# Patient Record
Sex: MALE | Race: WHITE | Employment: FULL TIME | URBAN - METROPOLITAN AREA
[De-identification: names, ages, dates, MRNs, and addresses within clinical notes are randomized per-mention and may not be internally consistent; named-entity substitution may affect disease eponyms.]

---

## 2019-05-20 ENCOUNTER — HOSPITAL ENCOUNTER (EMERGENCY)
Facility: CLINIC | Age: 49
Discharge: HOME | End: 2019-05-20
Attending: EMERGENCY MEDICINE | Admitting: EMERGENCY MEDICINE
Payer: OTHER MISCELLANEOUS

## 2019-05-20 VITALS
SYSTOLIC BLOOD PRESSURE: 148 MMHG | DIASTOLIC BLOOD PRESSURE: 87 MMHG | TEMPERATURE: 98.2 F | OXYGEN SATURATION: 96 % | HEART RATE: 82 BPM | RESPIRATION RATE: 18 BRPM

## 2019-05-20 DIAGNOSIS — M25.551 HIP PAIN, RIGHT: ICD-10-CM

## 2019-05-20 PROCEDURE — 99282 EMERGENCY DEPT VISIT SF MDM: CPT

## 2019-05-20 RX ORDER — HYDROCODONE BITARTRATE AND ACETAMINOPHEN 5; 325 MG/1; MG/1
1 TABLET ORAL EVERY 6 HOURS PRN
Qty: 10 TABLET | Refills: 0 | Status: SHIPPED | OUTPATIENT
Start: 2019-05-20 | End: 2022-03-01

## 2019-05-20 RX ORDER — IBUPROFEN 600 MG/1
600 TABLET, FILM COATED ORAL EVERY 6 HOURS PRN
Qty: 20 TABLET | Refills: 0 | Status: ON HOLD | OUTPATIENT
Start: 2019-05-20 | End: 2022-03-03

## 2019-05-20 ASSESSMENT — ENCOUNTER SYMPTOMS
ABDOMINAL PAIN: 0
MYALGIAS: 1
FEVER: 0
HEADACHES: 0
JOINT SWELLING: 1

## 2019-05-20 NOTE — ED TRIAGE NOTES
Friday pt injured pelvis and right knee and is still in pain. [facility]    Pt A&O x 3, CMS x 3, ABCD's adequate in triage

## 2019-05-20 NOTE — ED PROVIDER NOTES
History     Chief Complaint:  R. Hip pain    HPI   Jeromy Humphries is a 48 year old male who presents to the ED for persistent right hip pain. A few nights ago while he was working his night shift, his foot caught on a stair, twisting his leg. Patient denies falling during this event. Since this time, he has been experiencing right hip pain, as well as a swollen knee. Pain is increased when walking. He was in the ER on Friday near his home ([place]) where they took X-rays which were unremarkable, report below. He has been taking Ibuprofen 800 mg and prednisone and that has not helped his pain. Patient drives 110 miles to work each day, and presents to this ED due to proximity to work. He denies fever, back pain, urinary issues, numbness, and tingling. No history DVT.      X-Ray Hip 2V RT + pelvis 5/17/2019  Findings: Mild degenerative change in the sacroiliac joints and bilateral hips. No acute fracture or dislocation. Deformity of the superior of the superior right pubic ramus likely related to an old healed injury.  Zain Anderson MD     Allergies:  No Known Drug Allergies     Medications:    Omeprazole    Past Medical History:    GERD    Past Surgical History:    History reviewed. No pertinent surgical history.    Family History:    History reviewed. No pertinent family history.    Social History:  Smoking status: current everyday smoker  Alcohol use: yes  Marital Status: unknown      Review of Systems   Constitutional: Negative for fever.   Cardiovascular: Negative for chest pain.   Gastrointestinal: Negative for abdominal pain.   Musculoskeletal: Positive for gait problem, joint swelling and myalgias.   Neurological: Negative for headaches.   All other systems reviewed and are negative.      Physical Exam   First Vitals:  BP: (!) 167/123  Pulse: 94  Temp: 98.2  F (36.8  C)  Resp: 18  SpO2: 99 %      Physical Exam  Nursing note and vitals reviewed.  Constitutional: Well nourished.   Eyes: Conjunctiva  normal.  Pupils are equal, round, and reactive to light.   ENT: Nose normal. Mucous membranes pink and moist.    Neck: Normal range of motion.  CVS: Normal rate, regular rhythm.  Normal heart sounds.  No murmur.  Pulmonary: Lungs clear to auscultation bilaterally. No wheezes/rales/rhonchi.  GI: Abdomen soft. Nontender, nondistended. No rigidity or guarding.    MSK: No calf tenderness or swelling.  R. Hip Exam:  Inspection: No ecchymosis, erythema. No soft tissue swelling to RLE  Palpation: TTP over the R. hip  Strength: Able to actively flex/ext/add/abd hip. Gait stable.   Sensation: Intact to light touch distally  Cap refil:   < 2 seconds distally.   DP/PT Pulses  normal  R. knee: full flex/ext w/o pain, no ttp.    R. ankle: Able to flex/ext toes and DF/PF ankle actively. No TTP.  Neuro: Alert. Follows simple commands.  Skin: Skin is warm and dry. No rash noted.   Psychiatric: Normal affect.       Emergency Department Course   Emergency Department Course:  Past medical records, nursing notes, and vitals reviewed.  0608: I performed an exam of the patient and obtained history, as documented above.  0630: I rechecked the patient. Findings and plan explained to the Patient. Patient discharged home with instructions regarding supportive care, medications, and reasons to return. The importance of close follow-up was reviewed.     Impression & Plan      Medical Decision Makin yr old male presenting with persistent right hip pain. He his hypertensive on arrival but otherwise nontoxic appearing. His BP downtrended in ED without interventions. He is neurovascularly intact and without evidence of trauma. I reviewed patients recent XR report which was without local fracture. I did discuss with patient it is likely musculoskeletal in etiology. He reports the pain came on after he twisted his hip. He has been on outpatient prednisone as well as high dose Ibuprofen.  I did discuss with patient my recommendation to cease  said prednisone at this time. I recommended that he continue his Ibuprofen (though will change to 600mg)  and supplement with Norco for breakthrough pain. I counseled patient and considered other etiologies including but not limited to DVT, due to the fact that the patient travels in the car for long periods of time, though given reported mechanism, I have a stronger suspicion for musculoskeletal etiology. Patient was offered formal duplex today as well though declined. He was given Orthopedic follow up should his pain persist to follow up in a week. I recommended close PCP follow up for reevaluation in the next few days. Return to ED for focal weakness, increasing parenthesis, or if symptoms worsen or change.    Diagnosis:    ICD-10-CM    1. Hip pain, right M25.551        Disposition:  Discharged to home    Discharge Medications:     Medication List      Started    HYDROcodone-acetaminophen 5-325 MG tablet  Commonly known as:  NORCO  1 tablet, Oral, EVERY 6 HOURS PRN     ibuprofen 600 MG tablet  Commonly known as:  ADVIL/MOTRIN  600 mg, Oral, EVERY 6 HOURS PRN              Kimberley Montoya  5/20/2019   [facility] EMERGENCY DEPARTMENT  Scribe Disclosure:  DAMARIS, Kimberley Montoya, am serving as a scribe at 6:05 AM on 5/20/2019 to document services personally performed by La Rothman DO based on my observations and the provider's statements to me.        La Rothman DO  05/20/19 0261

## 2019-07-03 ENCOUNTER — TRANSFERRED RECORDS (OUTPATIENT)
Dept: HEALTH INFORMATION MANAGEMENT | Facility: CLINIC | Age: 49
End: 2019-07-03
Payer: COMMERCIAL

## 2021-07-16 ENCOUNTER — RECORDS - HEALTHEAST (OUTPATIENT)
Dept: ADMINISTRATIVE | Facility: CLINIC | Age: 51
End: 2021-07-16

## 2022-02-09 DIAGNOSIS — Z11.59 ENCOUNTER FOR SCREENING FOR OTHER VIRAL DISEASES: Primary | ICD-10-CM

## 2022-02-21 ENCOUNTER — TRANSFERRED RECORDS (OUTPATIENT)
Dept: MULTI SPECIALTY CLINIC | Facility: CLINIC | Age: 52
End: 2022-02-21

## 2022-02-21 LAB
CREATININE (EXTERNAL): 1.1 MG/DL (ref 0.6–1.2)
GFR ESTIMATED (EXTERNAL): 70 ML/MIN/{1.73M2}
GLUCOSE (EXTERNAL): 103 MG/DL (ref 70–99)
POTASSIUM (EXTERNAL): 4.1 MEQ/L (ref 3.4–5.1)

## 2022-03-01 RX ORDER — HYDROXYZINE HYDROCHLORIDE 25 MG/1
50 TABLET, FILM COATED ORAL AT BEDTIME
Status: ON HOLD | COMMUNITY
End: 2022-03-04

## 2022-03-03 ENCOUNTER — ANCILLARY PROCEDURE (OUTPATIENT)
Dept: ULTRASOUND IMAGING | Facility: CLINIC | Age: 52
End: 2022-03-03
Attending: ANESTHESIOLOGY

## 2022-03-03 ENCOUNTER — APPOINTMENT (OUTPATIENT)
Dept: RADIOLOGY | Facility: CLINIC | Age: 52
DRG: 460 | End: 2022-03-03
Attending: ORTHOPAEDIC SURGERY
Payer: OTHER MISCELLANEOUS

## 2022-03-03 ENCOUNTER — HOSPITAL ENCOUNTER (INPATIENT)
Facility: CLINIC | Age: 52
LOS: 1 days | Discharge: HOME | DRG: 460 | End: 2022-03-04
Attending: ORTHOPAEDIC SURGERY | Admitting: ORTHOPAEDIC SURGERY
Payer: OTHER MISCELLANEOUS

## 2022-03-03 ENCOUNTER — ANESTHESIA (OUTPATIENT)
Dept: SURGERY | Facility: CLINIC | Age: 52
DRG: 460 | End: 2022-03-03
Payer: OTHER MISCELLANEOUS

## 2022-03-03 ENCOUNTER — ANESTHESIA EVENT (OUTPATIENT)
Dept: SURGERY | Facility: CLINIC | Age: 52
DRG: 460 | End: 2022-03-03
Payer: OTHER MISCELLANEOUS

## 2022-03-03 PROBLEM — M51.9 FORAMINAL STENOSIS DUE TO INTERVERTEBRAL DISC DISEASE: Status: ACTIVE | Noted: 2022-03-03

## 2022-03-03 PROBLEM — M99.79 FORAMINAL STENOSIS DUE TO INTERVERTEBRAL DISC DISEASE: Status: ACTIVE | Noted: 2022-03-03

## 2022-03-03 LAB
ABO/RH(D): NORMAL
ANTIBODY SCREEN: NEGATIVE
ERYTHROCYTE [DISTWIDTH] IN BLOOD BY AUTOMATED COUNT: 12.1 % (ref 10–15)
HCT VFR BLD AUTO: 47.4 % (ref 40–53)
HGB BLD-MCNC: 16.8 G/DL (ref 13.3–17.7)
MCH RBC QN AUTO: 30.9 PG (ref 26.5–33)
MCHC RBC AUTO-ENTMCNC: 35.4 G/DL (ref 31.5–36.5)
MCV RBC AUTO: 87 FL (ref 78–100)
PLATELET # BLD AUTO: 223 10E3/UL (ref 150–450)
RBC # BLD AUTO: 5.44 10E6/UL (ref 4.4–5.9)
SPECIMEN EXPIRATION DATE: NORMAL
WBC # BLD AUTO: 6.6 10E3/UL (ref 4–11)

## 2022-03-03 PROCEDURE — L8699 PROSTHETIC IMPLANT NOS: HCPCS | Performed by: ORTHOPAEDIC SURGERY

## 2022-03-03 PROCEDURE — 258N000003 HC RX IP 258 OP 636: Performed by: ORTHOPAEDIC SURGERY

## 2022-03-03 PROCEDURE — 250N000009 HC RX 250: Performed by: ORTHOPAEDIC SURGERY

## 2022-03-03 PROCEDURE — 360N000085 HC SURGERY LEVEL 5 W/ FLUORO, PER MIN: Performed by: ORTHOPAEDIC SURGERY

## 2022-03-03 PROCEDURE — C1713 ANCHOR/SCREW BN/BN,TIS/BN: HCPCS | Performed by: ORTHOPAEDIC SURGERY

## 2022-03-03 PROCEDURE — 999N000141 HC STATISTIC PRE-PROCEDURE NURSING ASSESSMENT: Performed by: ORTHOPAEDIC SURGERY

## 2022-03-03 PROCEDURE — 250N000025 HC SEVOFLURANE, PER MIN: Performed by: ORTHOPAEDIC SURGERY

## 2022-03-03 PROCEDURE — 258N000003 HC RX IP 258 OP 636: Performed by: ANESTHESIOLOGY

## 2022-03-03 PROCEDURE — 86850 RBC ANTIBODY SCREEN: CPT | Performed by: PHYSICIAN ASSISTANT

## 2022-03-03 PROCEDURE — C1762 CONN TISS, HUMAN(INC FASCIA): HCPCS | Performed by: ORTHOPAEDIC SURGERY

## 2022-03-03 PROCEDURE — 710N000010 HC RECOVERY PHASE 1, LEVEL 2, PER MIN: Performed by: ORTHOPAEDIC SURGERY

## 2022-03-03 PROCEDURE — 999N000063 XR ABDOMEN PORT 1 VIEWS

## 2022-03-03 PROCEDURE — 85027 COMPLETE CBC AUTOMATED: CPT | Performed by: PHYSICIAN ASSISTANT

## 2022-03-03 PROCEDURE — 250N000011 HC RX IP 250 OP 636

## 2022-03-03 PROCEDURE — 0SG00A0 FUSION OF LUMBAR VERTEBRAL JOINT WITH INTERBODY FUSION DEVICE, ANTERIOR APPROACH, ANTERIOR COLUMN, OPEN APPROACH: ICD-10-PCS | Performed by: ORTHOPAEDIC SURGERY

## 2022-03-03 PROCEDURE — 120N000001 HC R&B MED SURG/OB

## 2022-03-03 PROCEDURE — 250N000011 HC RX IP 250 OP 636: Performed by: NURSE ANESTHETIST, CERTIFIED REGISTERED

## 2022-03-03 PROCEDURE — 86901 BLOOD TYPING SEROLOGIC RH(D): CPT | Performed by: PHYSICIAN ASSISTANT

## 2022-03-03 PROCEDURE — 250N000013 HC RX MED GY IP 250 OP 250 PS 637: Performed by: ORTHOPAEDIC SURGERY

## 2022-03-03 PROCEDURE — 250N000011 HC RX IP 250 OP 636: Performed by: ORTHOPAEDIC SURGERY

## 2022-03-03 PROCEDURE — 250N000013 HC RX MED GY IP 250 OP 250 PS 637: Performed by: PHYSICIAN ASSISTANT

## 2022-03-03 PROCEDURE — 999N000182 XR SURGERY CARM FLUORO GREATER THAN 5 MIN: Mod: TC

## 2022-03-03 PROCEDURE — 36415 COLL VENOUS BLD VENIPUNCTURE: CPT | Performed by: PHYSICIAN ASSISTANT

## 2022-03-03 PROCEDURE — 0ST20ZZ RESECTION OF LUMBAR VERTEBRAL DISC, OPEN APPROACH: ICD-10-PCS | Performed by: ORTHOPAEDIC SURGERY

## 2022-03-03 PROCEDURE — 272N000001 HC OR GENERAL SUPPLY STERILE: Performed by: ORTHOPAEDIC SURGERY

## 2022-03-03 PROCEDURE — 272N000004 HC RX 272: Performed by: ORTHOPAEDIC SURGERY

## 2022-03-03 PROCEDURE — 370N000017 HC ANESTHESIA TECHNICAL FEE, PER MIN: Performed by: ORTHOPAEDIC SURGERY

## 2022-03-03 PROCEDURE — 250N000011 HC RX IP 250 OP 636: Performed by: ANESTHESIOLOGY

## 2022-03-03 PROCEDURE — 250N000009 HC RX 250

## 2022-03-03 PROCEDURE — 250N000011 HC RX IP 250 OP 636: Performed by: PHYSICIAN ASSISTANT

## 2022-03-03 PROCEDURE — 250N000009 HC RX 250: Performed by: NURSE ANESTHETIST, CERTIFIED REGISTERED

## 2022-03-03 PROCEDURE — 250N000005 HC OR RX SURGIFLO HEMOSTATIC MATRIX 10ML 199102S OPNP: Performed by: ORTHOPAEDIC SURGERY

## 2022-03-03 DEVICE — ROI-A ANCHORING PLATE M
Type: IMPLANTABLE DEVICE | Site: SPINE LUMBAR | Status: FUNCTIONAL
Brand: ROI-A

## 2022-03-03 DEVICE — GRAFT BONE INFUSE BMP XSM 7510100: Type: IMPLANTABLE DEVICE | Site: SPINE LUMBAR | Status: FUNCTIONAL

## 2022-03-03 DEVICE — ROI-A MEDIAN IMPLANT PXL 30X39 H14 10°
Type: IMPLANTABLE DEVICE | Site: SPINE LUMBAR | Status: FUNCTIONAL
Brand: ROI-A

## 2022-03-03 DEVICE — IMP BONE MATRIX 5CC HUMAN DEMINERALIZED ABS-2012-05: Type: IMPLANTABLE DEVICE | Site: SPINE LUMBAR | Status: FUNCTIONAL

## 2022-03-03 DEVICE — GRAFT BONE INFUSE BMP SM 7510200: Type: IMPLANTABLE DEVICE | Site: SPINE LUMBAR | Status: FUNCTIONAL

## 2022-03-03 RX ORDER — LIDOCAINE 40 MG/G
CREAM TOPICAL
Status: DISCONTINUED | OUTPATIENT
Start: 2022-03-03 | End: 2022-03-03 | Stop reason: HOSPADM

## 2022-03-03 RX ORDER — ONDANSETRON 4 MG/1
4 TABLET, ORALLY DISINTEGRATING ORAL EVERY 30 MIN PRN
Status: DISCONTINUED | OUTPATIENT
Start: 2022-03-03 | End: 2022-03-03 | Stop reason: HOSPADM

## 2022-03-03 RX ORDER — METHOCARBAMOL 750 MG/1
750 TABLET, FILM COATED ORAL EVERY 6 HOURS PRN
Status: DISCONTINUED | OUTPATIENT
Start: 2022-03-03 | End: 2022-03-04 | Stop reason: HOSPADM

## 2022-03-03 RX ORDER — HYDROMORPHONE HYDROCHLORIDE 2 MG/1
2 TABLET ORAL EVERY 4 HOURS PRN
Status: DISCONTINUED | OUTPATIENT
Start: 2022-03-03 | End: 2022-03-04 | Stop reason: HOSPADM

## 2022-03-03 RX ORDER — HYDROXYZINE HYDROCHLORIDE 50 MG/ML
25 INJECTION, SOLUTION INTRAMUSCULAR EVERY 4 HOURS PRN
Status: DISCONTINUED | OUTPATIENT
Start: 2022-03-03 | End: 2022-03-04 | Stop reason: HOSPADM

## 2022-03-03 RX ORDER — HYDROMORPHONE HYDROCHLORIDE 4 MG/1
4 TABLET ORAL EVERY 4 HOURS PRN
Status: DISCONTINUED | OUTPATIENT
Start: 2022-03-03 | End: 2022-03-04 | Stop reason: HOSPADM

## 2022-03-03 RX ORDER — NALOXONE HYDROCHLORIDE 0.4 MG/ML
0.4 INJECTION, SOLUTION INTRAMUSCULAR; INTRAVENOUS; SUBCUTANEOUS
Status: DISCONTINUED | OUTPATIENT
Start: 2022-03-03 | End: 2022-03-04 | Stop reason: HOSPADM

## 2022-03-03 RX ORDER — ONDANSETRON 4 MG/1
4 TABLET, ORALLY DISINTEGRATING ORAL EVERY 6 HOURS PRN
Status: DISCONTINUED | OUTPATIENT
Start: 2022-03-03 | End: 2022-03-04 | Stop reason: HOSPADM

## 2022-03-03 RX ORDER — GABAPENTIN 300 MG/1
300 CAPSULE ORAL
Status: COMPLETED | OUTPATIENT
Start: 2022-03-03 | End: 2022-03-03

## 2022-03-03 RX ORDER — ACETAMINOPHEN 325 MG/1
650 TABLET ORAL EVERY 4 HOURS PRN
Status: DISCONTINUED | OUTPATIENT
Start: 2022-03-06 | End: 2022-03-04 | Stop reason: HOSPADM

## 2022-03-03 RX ORDER — ONDANSETRON 2 MG/ML
4 INJECTION INTRAMUSCULAR; INTRAVENOUS EVERY 6 HOURS PRN
Status: DISCONTINUED | OUTPATIENT
Start: 2022-03-03 | End: 2022-03-04 | Stop reason: HOSPADM

## 2022-03-03 RX ORDER — PROCHLORPERAZINE MALEATE 10 MG
10 TABLET ORAL EVERY 6 HOURS PRN
Status: DISCONTINUED | OUTPATIENT
Start: 2022-03-03 | End: 2022-03-04 | Stop reason: HOSPADM

## 2022-03-03 RX ORDER — POLYETHYLENE GLYCOL 3350 17 G/17G
17 POWDER, FOR SOLUTION ORAL DAILY
Status: DISCONTINUED | OUTPATIENT
Start: 2022-03-04 | End: 2022-03-04 | Stop reason: HOSPADM

## 2022-03-03 RX ORDER — HYDROMORPHONE HCL IN WATER/PF 6 MG/30 ML
0.2 PATIENT CONTROLLED ANALGESIA SYRINGE INTRAVENOUS EVERY 5 MIN PRN
Status: DISCONTINUED | OUTPATIENT
Start: 2022-03-03 | End: 2022-03-03 | Stop reason: HOSPADM

## 2022-03-03 RX ORDER — SODIUM CHLORIDE, SODIUM LACTATE, POTASSIUM CHLORIDE, CALCIUM CHLORIDE 600; 310; 30; 20 MG/100ML; MG/100ML; MG/100ML; MG/100ML
INJECTION, SOLUTION INTRAVENOUS CONTINUOUS
Status: DISCONTINUED | OUTPATIENT
Start: 2022-03-03 | End: 2022-03-03 | Stop reason: HOSPADM

## 2022-03-03 RX ORDER — SODIUM CHLORIDE 9 MG/ML
INJECTION, SOLUTION INTRAVENOUS CONTINUOUS
Status: DISCONTINUED | OUTPATIENT
Start: 2022-03-03 | End: 2022-03-04 | Stop reason: HOSPADM

## 2022-03-03 RX ORDER — KETAMINE HYDROCHLORIDE 50 MG/ML
INJECTION, SOLUTION INTRAMUSCULAR; INTRAVENOUS PRN
Status: DISCONTINUED | OUTPATIENT
Start: 2022-03-03 | End: 2022-03-03

## 2022-03-03 RX ORDER — ACETAMINOPHEN 325 MG/1
975 TABLET ORAL EVERY 8 HOURS
Status: DISCONTINUED | OUTPATIENT
Start: 2022-03-03 | End: 2022-03-04 | Stop reason: HOSPADM

## 2022-03-03 RX ORDER — NALOXONE HYDROCHLORIDE 0.4 MG/ML
0.2 INJECTION, SOLUTION INTRAMUSCULAR; INTRAVENOUS; SUBCUTANEOUS
Status: DISCONTINUED | OUTPATIENT
Start: 2022-03-03 | End: 2022-03-04 | Stop reason: HOSPADM

## 2022-03-03 RX ORDER — FENTANYL CITRATE 50 UG/ML
25 INJECTION, SOLUTION INTRAMUSCULAR; INTRAVENOUS
Status: DISCONTINUED | OUTPATIENT
Start: 2022-03-03 | End: 2022-03-03 | Stop reason: HOSPADM

## 2022-03-03 RX ORDER — FENTANYL CITRATE 50 UG/ML
25 INJECTION, SOLUTION INTRAMUSCULAR; INTRAVENOUS EVERY 5 MIN PRN
Status: DISCONTINUED | OUTPATIENT
Start: 2022-03-03 | End: 2022-03-03 | Stop reason: HOSPADM

## 2022-03-03 RX ORDER — MULTIVITAMIN,THERAPEUTIC
1 TABLET ORAL DAILY
Status: DISCONTINUED | OUTPATIENT
Start: 2022-03-04 | End: 2022-03-04 | Stop reason: HOSPADM

## 2022-03-03 RX ORDER — OXYCODONE HYDROCHLORIDE 5 MG/1
5-10 TABLET ORAL EVERY 4 HOURS PRN
Status: DISCONTINUED | OUTPATIENT
Start: 2022-03-03 | End: 2022-03-04 | Stop reason: HOSPADM

## 2022-03-03 RX ORDER — LIDOCAINE HYDROCHLORIDE 10 MG/ML
INJECTION, SOLUTION INFILTRATION; PERINEURAL PRN
Status: DISCONTINUED | OUTPATIENT
Start: 2022-03-03 | End: 2022-03-03

## 2022-03-03 RX ORDER — HYDROXYZINE HYDROCHLORIDE 25 MG/1
25 TABLET, FILM COATED ORAL EVERY 4 HOURS PRN
Status: DISCONTINUED | OUTPATIENT
Start: 2022-03-03 | End: 2022-03-04 | Stop reason: HOSPADM

## 2022-03-03 RX ORDER — LORATADINE 10 MG/1
10 TABLET ORAL DAILY PRN
Status: DISCONTINUED | OUTPATIENT
Start: 2022-03-03 | End: 2022-03-04 | Stop reason: HOSPADM

## 2022-03-03 RX ORDER — ACETAMINOPHEN 325 MG/1
975 TABLET ORAL ONCE
Status: COMPLETED | OUTPATIENT
Start: 2022-03-03 | End: 2022-03-03

## 2022-03-03 RX ORDER — FENTANYL CITRATE 50 UG/ML
100 INJECTION, SOLUTION INTRAMUSCULAR; INTRAVENOUS ONCE
Status: DISCONTINUED | OUTPATIENT
Start: 2022-03-03 | End: 2022-03-03 | Stop reason: HOSPADM

## 2022-03-03 RX ORDER — CEFAZOLIN SODIUM/WATER 2 G/20 ML
2 SYRINGE (ML) INTRAVENOUS SEE ADMIN INSTRUCTIONS
Status: DISCONTINUED | OUTPATIENT
Start: 2022-03-03 | End: 2022-03-03 | Stop reason: HOSPADM

## 2022-03-03 RX ORDER — DIAZEPAM 5 MG
5 TABLET ORAL EVERY 4 HOURS PRN
Status: DISCONTINUED | OUTPATIENT
Start: 2022-03-03 | End: 2022-03-04 | Stop reason: HOSPADM

## 2022-03-03 RX ORDER — CEFAZOLIN SODIUM/WATER 2 G/20 ML
2 SYRINGE (ML) INTRAVENOUS
Status: COMPLETED | OUTPATIENT
Start: 2022-03-03 | End: 2022-03-03

## 2022-03-03 RX ORDER — HYDROMORPHONE HCL IN WATER/PF 6 MG/30 ML
0.2 PATIENT CONTROLLED ANALGESIA SYRINGE INTRAVENOUS
Status: DISCONTINUED | OUTPATIENT
Start: 2022-03-03 | End: 2022-03-04 | Stop reason: HOSPADM

## 2022-03-03 RX ORDER — NAPROXEN SODIUM 220 MG
440 TABLET ORAL 2 TIMES DAILY PRN
Status: ON HOLD | COMMUNITY
End: 2022-03-04

## 2022-03-03 RX ORDER — MEPERIDINE HYDROCHLORIDE 25 MG/ML
12.5 INJECTION INTRAMUSCULAR; INTRAVENOUS; SUBCUTANEOUS
Status: DISCONTINUED | OUTPATIENT
Start: 2022-03-03 | End: 2022-03-03 | Stop reason: HOSPADM

## 2022-03-03 RX ORDER — OXYCODONE HYDROCHLORIDE 5 MG/1
5 TABLET ORAL EVERY 4 HOURS PRN
Status: DISCONTINUED | OUTPATIENT
Start: 2022-03-03 | End: 2022-03-03 | Stop reason: HOSPADM

## 2022-03-03 RX ORDER — FENTANYL CITRATE 50 UG/ML
100 INJECTION, SOLUTION INTRAMUSCULAR; INTRAVENOUS
Status: DISCONTINUED | OUTPATIENT
Start: 2022-03-03 | End: 2022-03-03 | Stop reason: HOSPADM

## 2022-03-03 RX ORDER — CEFAZOLIN SODIUM 2 G/100ML
2 INJECTION, SOLUTION INTRAVENOUS EVERY 8 HOURS
Status: COMPLETED | OUTPATIENT
Start: 2022-03-03 | End: 2022-03-04

## 2022-03-03 RX ORDER — HYDROXYZINE HYDROCHLORIDE 25 MG/1
25 TABLET, FILM COATED ORAL EVERY 6 HOURS PRN
Status: DISCONTINUED | OUTPATIENT
Start: 2022-03-03 | End: 2022-03-04 | Stop reason: HOSPADM

## 2022-03-03 RX ORDER — ONDANSETRON 2 MG/ML
4 INJECTION INTRAMUSCULAR; INTRAVENOUS EVERY 30 MIN PRN
Status: DISCONTINUED | OUTPATIENT
Start: 2022-03-03 | End: 2022-03-03 | Stop reason: HOSPADM

## 2022-03-03 RX ORDER — AMOXICILLIN 250 MG
1 CAPSULE ORAL 2 TIMES DAILY
Status: DISCONTINUED | OUTPATIENT
Start: 2022-03-03 | End: 2022-03-04 | Stop reason: HOSPADM

## 2022-03-03 RX ORDER — PROPOFOL 10 MG/ML
INJECTION, EMULSION INTRAVENOUS PRN
Status: DISCONTINUED | OUTPATIENT
Start: 2022-03-03 | End: 2022-03-03

## 2022-03-03 RX ORDER — HYDROMORPHONE HCL IN WATER/PF 6 MG/30 ML
0.4 PATIENT CONTROLLED ANALGESIA SYRINGE INTRAVENOUS
Status: DISCONTINUED | OUTPATIENT
Start: 2022-03-03 | End: 2022-03-04 | Stop reason: HOSPADM

## 2022-03-03 RX ORDER — OMEPRAZOLE 20 MG/1
20 TABLET, DELAYED RELEASE ORAL DAILY PRN
COMMUNITY

## 2022-03-03 RX ORDER — FENTANYL CITRATE 50 UG/ML
INJECTION, SOLUTION INTRAMUSCULAR; INTRAVENOUS PRN
Status: DISCONTINUED | OUTPATIENT
Start: 2022-03-03 | End: 2022-03-03

## 2022-03-03 RX ORDER — BISACODYL 10 MG
10 SUPPOSITORY, RECTAL RECTAL DAILY PRN
Status: DISCONTINUED | OUTPATIENT
Start: 2022-03-03 | End: 2022-03-04 | Stop reason: HOSPADM

## 2022-03-03 RX ORDER — DEXAMETHASONE SODIUM PHOSPHATE 10 MG/ML
INJECTION, SOLUTION INTRAMUSCULAR; INTRAVENOUS PRN
Status: DISCONTINUED | OUTPATIENT
Start: 2022-03-03 | End: 2022-03-03

## 2022-03-03 RX ORDER — ONDANSETRON 2 MG/ML
INJECTION INTRAMUSCULAR; INTRAVENOUS PRN
Status: DISCONTINUED | OUTPATIENT
Start: 2022-03-03 | End: 2022-03-03

## 2022-03-03 RX ADMIN — MIDAZOLAM 2 MG: 1 INJECTION INTRAMUSCULAR; INTRAVENOUS at 11:35

## 2022-03-03 RX ADMIN — SENNOSIDES AND DOCUSATE SODIUM 1 TABLET: 50; 8.6 TABLET ORAL at 21:32

## 2022-03-03 RX ADMIN — ACETAMINOPHEN 975 MG: 325 TABLET ORAL at 18:11

## 2022-03-03 RX ADMIN — SUGAMMADEX 200 MG: 100 INJECTION, SOLUTION INTRAVENOUS at 13:36

## 2022-03-03 RX ADMIN — HYDROXYZINE HYDROCHLORIDE 25 MG: 25 TABLET ORAL at 18:12

## 2022-03-03 RX ADMIN — DEXAMETHASONE SODIUM PHOSPHATE 10 MG: 10 INJECTION, SOLUTION INTRAMUSCULAR; INTRAVENOUS at 11:45

## 2022-03-03 RX ADMIN — HYDROMORPHONE HYDROCHLORIDE 0.4 MG: 0.2 INJECTION, SOLUTION INTRAMUSCULAR; INTRAVENOUS; SUBCUTANEOUS at 15:45

## 2022-03-03 RX ADMIN — HYDROMORPHONE HYDROCHLORIDE 4 MG: 4 TABLET ORAL at 16:45

## 2022-03-03 RX ADMIN — ROCURONIUM BROMIDE 70 MG: 10 INJECTION, SOLUTION INTRAVENOUS at 11:42

## 2022-03-03 RX ADMIN — GABAPENTIN 300 MG: 300 CAPSULE ORAL at 10:17

## 2022-03-03 RX ADMIN — OXYCODONE HYDROCHLORIDE 5 MG: 5 TABLET ORAL at 23:02

## 2022-03-03 RX ADMIN — FENTANYL CITRATE 25 MCG: 50 INJECTION, SOLUTION INTRAMUSCULAR; INTRAVENOUS at 14:29

## 2022-03-03 RX ADMIN — SODIUM CHLORIDE: 9 INJECTION, SOLUTION INTRAVENOUS at 16:13

## 2022-03-03 RX ADMIN — LIDOCAINE HYDROCHLORIDE 30 MG: 10 INJECTION, SOLUTION INFILTRATION; PERINEURAL at 11:42

## 2022-03-03 RX ADMIN — OXYCODONE HYDROCHLORIDE 10 MG: 5 TABLET ORAL at 18:12

## 2022-03-03 RX ADMIN — FENTANYL CITRATE 25 MCG: 50 INJECTION, SOLUTION INTRAMUSCULAR; INTRAVENOUS at 14:19

## 2022-03-03 RX ADMIN — HYDROMORPHONE HYDROCHLORIDE 0.5 MG: 1 INJECTION, SOLUTION INTRAMUSCULAR; INTRAVENOUS; SUBCUTANEOUS at 12:30

## 2022-03-03 RX ADMIN — FENTANYL CITRATE 25 MCG: 50 INJECTION, SOLUTION INTRAMUSCULAR; INTRAVENOUS at 14:03

## 2022-03-03 RX ADMIN — SODIUM CHLORIDE, POTASSIUM CHLORIDE, SODIUM LACTATE AND CALCIUM CHLORIDE: 600; 310; 30; 20 INJECTION, SOLUTION INTRAVENOUS at 10:35

## 2022-03-03 RX ADMIN — HYDROMORPHONE HYDROCHLORIDE 0.5 MG: 1 INJECTION, SOLUTION INTRAMUSCULAR; INTRAVENOUS; SUBCUTANEOUS at 13:15

## 2022-03-03 RX ADMIN — SODIUM CHLORIDE, POTASSIUM CHLORIDE, SODIUM LACTATE AND CALCIUM CHLORIDE: 600; 310; 30; 20 INJECTION, SOLUTION INTRAVENOUS at 12:30

## 2022-03-03 RX ADMIN — ROCURONIUM BROMIDE 30 MG: 10 INJECTION, SOLUTION INTRAVENOUS at 12:35

## 2022-03-03 RX ADMIN — ONDANSETRON 4 MG: 2 INJECTION INTRAMUSCULAR; INTRAVENOUS at 13:10

## 2022-03-03 RX ADMIN — KETAMINE HYDROCHLORIDE 50 MG: 50 INJECTION, SOLUTION INTRAMUSCULAR; INTRAVENOUS at 11:45

## 2022-03-03 RX ADMIN — FENTANYL CITRATE 100 MCG: 50 INJECTION, SOLUTION INTRAMUSCULAR; INTRAVENOUS at 11:42

## 2022-03-03 RX ADMIN — ACETAMINOPHEN 975 MG: 325 TABLET ORAL at 10:17

## 2022-03-03 RX ADMIN — FENTANYL CITRATE 25 MCG: 50 INJECTION, SOLUTION INTRAMUSCULAR; INTRAVENOUS at 13:55

## 2022-03-03 RX ADMIN — PROPOFOL 200 MG: 10 INJECTION, EMULSION INTRAVENOUS at 11:42

## 2022-03-03 RX ADMIN — Medication 2 G: at 12:00

## 2022-03-03 RX ADMIN — CEFAZOLIN SODIUM 2 G: 2 INJECTION, SOLUTION INTRAVENOUS at 19:40

## 2022-03-03 ASSESSMENT — ACTIVITIES OF DAILY LIVING (ADL)
ADLS_ACUITY_SCORE: 5
ADLS_ACUITY_SCORE: 3
ADLS_ACUITY_SCORE: 5
ADLS_ACUITY_SCORE: 3
ADLS_ACUITY_SCORE: 3
ADLS_ACUITY_SCORE: 5
ADLS_ACUITY_SCORE: 5
ADLS_ACUITY_SCORE: 12
ADLS_ACUITY_SCORE: 5
ADLS_ACUITY_SCORE: 5

## 2022-03-03 NOTE — PROGRESS NOTES
Portable abdominal xray taken intra-op at end of case to confirm that no foreign bodies or instrumentation was retained in the surgical wound. Dr. Pate confirmed

## 2022-03-03 NOTE — ANESTHESIA PREPROCEDURE EVALUATION
Anesthesia Pre-Procedure Evaluation    Patient: Jeromy Humphries   MRN: [id number] : 1970        Procedure : Procedure(s):  BILATERAL LUMBAR 3-4 ANTERIOR LUMBAR INTERBODY FUSION WITH INFUSE  SURGICAL EXPOSURE, ANTERIOR APPROACH, WITH WOUND CLOSURE, FOR LUMBAR SPINE SURGERY, BY GENERAL SURGERY          Past Medical History:   Diagnosis Date     Gastroesophageal reflux disease       Past Surgical History:   Procedure Laterality Date     ANKLE SURGERY Right      CARPAL TUNNEL RELEASE RT/LT Bilateral      MICRODISCECTOMY LUMBAR      L3-L4      Allergies   Allergen Reactions     Oxycodone Itching      Social History     Tobacco Use     Smoking status: Current Every Day Smoker     Packs/day: 0.50     Years: 25.00     Pack years: 12.50     Smokeless tobacco: Never Used   Substance Use Topics     Alcohol use: Yes     Comment: Rare/Social      Wt Readings from Last 1 Encounters:   22 102.5 kg (226 lb)        Anesthesia Evaluation            ROS/MED HX  ENT/Pulmonary:  - neg pulmonary ROS     Neurologic:  - neg neurologic ROS     Cardiovascular:  - neg cardiovascular ROS     METS/Exercise Tolerance:     Hematologic:  - neg hematologic  ROS     Musculoskeletal:  - neg musculoskeletal ROS     GI/Hepatic:  - neg GI/hepatic ROS     Renal/Genitourinary:  - neg Renal ROS     Endo:  - neg endo ROS     Psychiatric/Substance Use:  - neg psychiatric ROS     Infectious Disease:  - neg infectious disease ROS     Malignancy:  - neg malignancy ROS     Other:  - neg other ROS          Physical Exam    Airway  airway exam normal      Mallampati: II       Respiratory Devices and Support         Dental  no notable dental history         Cardiovascular   cardiovascular exam normal          Pulmonary   pulmonary exam normal                OUTSIDE LABS:  CBC: No results found for: WBC, HGB, HCT, PLT  BMP: No results found for: NA, POTASSIUM, CHLORIDE, CO2, BUN, CR, GLC  COAGS: No results found for: PTT, INR, FIBR  POC: No  results found for: BGM, HCG, HCGS  HEPATIC: No results found for: ALBUMIN, PROTTOTAL, ALT, AST, GGT, ALKPHOS, BILITOTAL, BILIDIRECT, YISEL  OTHER: No results found for: PH, LACT, A1C, ABRAHAM, PHOS, MAG, LIPASE, AMYLASE, TSH, T4, T3, CRP, SED    Anesthesia Plan    ASA Status:  2      Anesthesia Type: General.     - Airway: ETT   Induction: Intravenous.           Consents    Anesthesia Plan(s) and associated risks, benefits, and realistic alternatives discussed. Questions answered and patient/representative(s) expressed understanding.    - Discussed:     - Discussed with:  Patient         Postoperative Care            Comments:                Alexi Garvey MD

## 2022-03-03 NOTE — ANESTHESIA POSTPROCEDURE EVALUATION
Patient: Jeromy Humphries    Procedure: Procedure(s):  BILATERAL LUMBAR 3-4 ANTERIOR LUMBAR INTERBODY FUSION WITH INFUSE  SURGICAL EXPOSURE, ANTERIOR APPROACH, WITH WOUND CLOSURE, FOR LUMBAR SPINE SURGERY, BY GENERAL SURGERY       Anesthesia Type:  General    Note:     Postop Pain Control: Uneventful            Sign Out: Well controlled pain   PONV: No   Neuro/Psych: Uneventful            Sign Out: Acceptable/Baseline neuro status   Airway/Respiratory: Uneventful            Sign Out: Acceptable/Baseline resp. status   CV/Hemodynamics: Uneventful            Sign Out: Acceptable CV status   Other NRE: NONE   DID A NON-ROUTINE EVENT OCCUR? No           Last vitals:  Vitals Value Taken Time   /77 03/03/22 1450   Temp 37  C (98.6  F) 03/03/22 1450   Pulse 79 03/03/22 1451   Resp 10 03/03/22 1450   SpO2 97 % 03/03/22 1451   Vitals shown include unvalidated device data.    Electronically Signed By: Alexi Garvey MD  March 3, 2022  3:37 PM

## 2022-03-03 NOTE — ANESTHESIA PROCEDURE NOTES
Airway       Patient location during procedure: OR       Procedure Start/Stop Times: 3/3/2022 11:44 AM and 3/3/2022 11:47 AM  Staff -        CRNA: Narciso Pierson APRN CRNA       Performed By: CRNAIndications and Patient Condition       Indications for airway management: crystal-procedural         Mask difficulty assessment: 1 - vent by mask    Final Airway Details       Final airway type: endotracheal airway       Successful airway: ETT - single  Endotracheal Airway Details        ETT size (mm): 7.5       Cuffed: yes       Successful intubation technique: direct laryngoscopy       DL Blade Type: Vasquez 2       Grade View of Cords: 1       Adjucts: stylet and tooth guard       Position: Right       Measured from: lips       Secured at (cm): 23    Post intubation assessment        Placement verified by: capnometry, equal breath sounds and chest rise        Number of attempts at approach: 1       Secured with: pink tape       Ease of procedure: easy       Dentition: Intact and Unchanged

## 2022-03-03 NOTE — ANESTHESIA CARE TRANSFER NOTE
Patient: Jeromy Humphries    Procedure: Procedure(s):  BILATERAL LUMBAR 3-4 ANTERIOR LUMBAR INTERBODY FUSION WITH INFUSE  SURGICAL EXPOSURE, ANTERIOR APPROACH, WITH WOUND CLOSURE, FOR LUMBAR SPINE SURGERY, BY GENERAL SURGERY       Diagnosis: Acute low back pain [M54.50]  Diagnosis Additional Information: No value filed.    Anesthesia Type:   General     Note:    Oropharynx: oropharynx clear of all foreign objects  Level of Consciousness: awake  Oxygen Supplementation: face mask  Level of Supplemental Oxygen (L/min / FiO2): 6  Independent Airway: airway patency satisfactory and stable  Dentition: dentition unchanged  Vital Signs Stable: post-procedure vital signs reviewed and stable  Report to RN Given: handoff report given  Patient transferred to: PACU    Handoff Report: Identifed the Patient, Identified the Reponsible Provider, Reviewed the pertinent medical history, Discussed the surgical course, Reviewed Intra-OP anesthesia mangement and issues during anesthesia, Set expectations for post-procedure period and Allowed opportunity for questions and acknowledgement of understanding      Vitals:  Vitals Value Taken Time   /86 03/03/22 1347   Temp 36.3  C (97.3  F) 03/03/22 1347   Pulse 74 03/03/22 1347   Resp 8 03/03/22 1347   SpO2 95 % 03/03/22 1347       Electronically Signed By: SAPPHIRE Phillips CRNA  March 3, 2022  1:49 PM

## 2022-03-03 NOTE — PHARMACY-ADMISSION MEDICATION HISTORY
Pharmacy Note - Admission Medication History    Pertinent Provider Information:    ______________________________________________________________________    Prior To Admission (PTA) med list completed and updated in EMR.       PTA Med List   Medication Sig Last Dose     hydrOXYzine (ATARAX) 25 MG tablet Take 50 mg by mouth At Bedtime  3/2/2022 at 1800     naproxen sodium (ANAPROX) 220 MG tablet Take 440 mg by mouth 2 times daily as needed for moderate pain 2/26/2022     omeprazole (PRILOSEC OTC) 20 MG EC tablet Take 20 mg by mouth daily as needed 2/28/2022       Information source(s): Patient and CareEverywhere/SureScripts, clinic    Method of interview communication: in-person    Patient was asked about OTC/herbal products specifically.  PTA med list reflects this.    Based on the pharmacist's assessment, the PTA med list information appears reliable    Allergies were reviewed, assessed, and updated with the patient.      Patient does not use any multi-dose medications prior to admission.     Thank you for the opportunity to participate in the care of this patient.      Shari Birch RPH     3/3/2022     11:03 AM

## 2022-03-03 NOTE — OP NOTE
NAME: Jeromy Humphries  : 1970    Procedure Date: 2022    PREOPERATIVE DIAGNOSES:  Right L3 radiculopathy secondary to small recurrent right L3-4 herniated nucleus pulposus and foraminal stenosis.    POSTOPERATIVE DIAGNOSES:  Right L3 radiculopathy secondary to small recurrent right L3-4 herniated nucleus pulposus and foraminal stenosis.    PROCEDURE PERFORMED:    1.  L3-4 anterior lumbar interbody fusion.  2.  Application of anterior lumbar interbody device (LDR KEVIN-A PEEK interbody implant 14 mm height, 30 mm depth, 39 mm with 10-degree lordosis).  3.  Application of anterior lumbar instrumentation, L3-4 (LDR KEVIN-A titanium fixation plate.  4.  Revision microdiskectomy L3-4 with restoration of normal foraminal volume and anatomic height.  5.  Use of nonstructural allograft for spinal surgery (infused bone morphogenic protein).    ATTENDING SURGEON:  Abhijit Szymanski MD    ASSISTANT:  Todd Holter, PA-C and Yong Gao MD.  Please note this procedure technically required an assistant for the safety of the patient.  Mr. Holter is an experienced PA in spinal surgery.  His assistance was imperative and necessary to safely protect surrounding soft tissue neural structures as I performed decompressive phase of the procedure.  Dr. Gao was present for an anterior retroperitoneal approach to the lumbar spine.    ANESTHESIA:  General endotracheal anesthesia.    ESTIMATED BLOOD LOSS:  Less than 25 mL    PACKS/DRAINS:  None.    COMPLICATIONS:  None.    CONDITION:  Stable.    DISPOSITION:  Postanesthesia care unit.    INSTRUMENTATION:  LDR KEVIN-A titanium PEEK interbody implant with titanium fixation plates.    INDICATIONS FOR PROCEDURE:  Mr. Humphries is a pleasant 51-year-old gentleman who previously underwent a right sided L3-4 far lateral microdecompression in 2019, did well initially, however, developed recurrent symptoms consistent with reencroachment on the L3 nerve root.  An exhaustive course of  conservative care had been attempted, but was unsuccessful and the patient opted for surgical intervention.  A detailed description of the risks, benefits, and treatment alternatives inherent to the operation was explained in advance to the patient, including but not limited to failure to improve, cerebrospinal fluid leakage, hemorrhage, infection, permanent or transient nerve root damage, need for future surgery, adjacent segment disease or recurrence, the risks of nonunion as well as the use of an anterior approach surgery were described in detail, including the risks of parasympathetic sympathetic nerve injury, visceral or vascular injury.  Despite these, he elected to proceed.  He was then seen by his primary care physician and scheduled for surgery.    DESCRIPTION OF PROCEDURE:  The patient was met in the preoperative holding area by myself, signed consent was reviewed and signed.  Site was marked on the patient's back.  He was then seen by the anesthesia service and escorted back to the operating room.  Upon arrival in the operating room, patient was intubated by the Anesthesia Service without complication.  A Dennison catheter was inserted.  He was placed in the supine position on the operating table.  Special attention paid to padding bony prominences or superficial nerves.  His anterior abdomen was then prepped and draped in normal sterile fashion after successful placement of a Dennison catheter. After his abdomen was prepped, a timeout was called to ensure the proper procedure to be performed as well as the administration of prophylactic antibiotics.  Once this was completed, the procedure began.  Dr. Yong Gao performed a left-sided anterior retroperitoneal approach to lumbar spine, a description of which can be found in his separate dictation.  After he successfully exposed the L3-4 disk space and retracted the major visceral and vascular structures, a bent spinal needle was placed into the disk space and  AP and lateral fluoroscopic image confirmed the correct operative level, as well as the midline.  Once the midline was marked, the needle was removed.  Dr. Gao then provided excellent exposure of the L3-4 disk space.  I used an 11 blade to incise the outer annulus and performed a complete diskectomy back to the posterior longitudinal ligament with a series of straight and angled curettes, Kerrison and pituitary rongeurs.  The disk space was sequentially elevated to normal anatomic height with bullet distractors, which allowed us to gain access to the posterolateral corners and remove all remaining disk material.  We particularly addressed the right side where we extended into the lateral border of the annulus.  We were able to remove all loose disk material which had protruded through this.  At this point, we had sequentially elevated the disk space to normal anatomic height and a trial spacer was placed.  A 14 mm height, 30 mm depth, 39 mm width, a 10-degree lordotic spacer was found to have the best secure fit.  It was impacted into place between the L3 and L4 vertebral bodies under live lateral fluoroscopic imaging and its position was confirmed on C-arm in the AP and lateral planes.  The trial was then removed.  We then addressed the bony endplates by removing all remaining cartilage and taking this to a good bed of bleeding bone.  Infused bone morphogenic protein had been mixed away from the operative field and was allowed to mature for at least 20 minutes before placement in the implant itself.  The implant was then secured to the insertion jig and then while Dr. Gao provided exposure, it was impacted into place between the L3 and L4 vertebral bodies recessed to the appropriate depth under live lateral fluoroscopic imaging.  It was found to have a good secure fit.  At this point, we secured this into place with 2 titanium fixation plates, which were advanced through the insertion jig into the L3 and L4  vertebral bodies until they were in the locked position.  They too, were found to have a good secure fit.  The insertion jig was then removed.  AP and lateral fluoroscopic images confirmed the correct orientation and position of the hardware.  We then removed the retractor. Dr. Gao provided closure to the anterior abdominal incision, a description of which can be found in his separate dictation.  The subcutaneous tissue was closed with 2-0 interrupted Vicryl suture and skin was closed with running subcuticular 4-0 stitch.  Wound was then cleaned, dried in normal fashion, Steri-Strips and gauze applied.  The drapes were removed.  The patient was transferred from the operating table to the stretcher, at which point he was extubated by the Anesthesia Service without complication.  All needle and sponge counts were correct at the end of the case.    Abhijit Szymanski MD        D: 2022   T: 2022   MT: [email or web address]    Name:     JAMAR ROBBLatonya  MRN:      [id number]        Account:        [id number]   :      1970           Procedure Date: 2022     Document: [id number]

## 2022-03-03 NOTE — BRIEF OP NOTE
[facility]    Brief Operative Note    Pre-operative diagnosis: Recurrent L3/4 HNP right, foraminal stenosis  Post-operative diagnosis Same as pre-operative diagnosis    Procedure: Procedure(s):  BILATERAL LUMBAR 3-4 ANTERIOR LUMBAR INTERBODY FUSION WITH INFUSE  SURGICAL EXPOSURE, ANTERIOR APPROACH, WITH WOUND CLOSURE, FOR LUMBAR SPINE SURGERY, BY GENERAL SURGERY  Surgeon: Surgeon(s) and Role:     * Abhijit Szymanski MD - Primary     * Yong Gao MD - Assisting     * Holter, Todd Jeramie, PA - Assisting  Anesthesia: General   Estimated Blood Loss: Less than 50 ml    Drains: None  Specimens: * No specimens in log *  Findings:   None.  Complications: None.  Implants:   Implant Name Type Inv. Item Serial No.  Lot No. LRB No. Used Action   GRAFT BONE INFUSE BMP XSM [id number] - [id number]  GRAFT BONE INFUSE BMP XSM [id number] [employer] [id number] N/A 1 Implanted   GRAFT BONE INFUSE BMP SM [id number] - [id number]  GRAFT BONE INFUSE BMP SM [id number] [employer] [id number] N/A 1 Implanted   IMP BONE MATRIX 5CC HUMAN DEMINERALIZED ABS-2012-05 - [id number] Bone/Tissue/Biologic IMP BONE MATRIX 5CC HUMAN DEMINERALIZED ABS-2012-05 [id number] [employer] [id number] N/A 1 Implanted   IMP CAGE LDR KEVIN-A MEDIAN ALIF [id number] 10DEG 14MM [id number] - [id number] Metallic Hardware/[place] IMP CAGE LDR KEVIN-A MEDIAN ALIF [id number] 10DEG 14MM [id number]  LDR SPINE [id number] N/A 1 Implanted   IMP PLATE LDR KEVIN-A +0.5MM THICK MED [id number] - [id number] Metallic Hardware/[place] IMP PLATE LDR KEVIN-A +0.5MM THICK MED [id number]  [employer] [id number] N/A 1 Implanted

## 2022-03-04 ENCOUNTER — APPOINTMENT (OUTPATIENT)
Dept: PHYSICAL THERAPY | Facility: CLINIC | Age: 52
DRG: 460 | End: 2022-03-04
Attending: ORTHOPAEDIC SURGERY
Payer: OTHER MISCELLANEOUS

## 2022-03-04 ENCOUNTER — APPOINTMENT (OUTPATIENT)
Dept: OCCUPATIONAL THERAPY | Facility: CLINIC | Age: 52
DRG: 460 | End: 2022-03-04
Attending: ORTHOPAEDIC SURGERY
Payer: OTHER MISCELLANEOUS

## 2022-03-04 VITALS
RESPIRATION RATE: 18 BRPM | WEIGHT: 226 LBS | DIASTOLIC BLOOD PRESSURE: 83 MMHG | HEART RATE: 97 BPM | TEMPERATURE: 97.9 F | HEIGHT: 72 IN | SYSTOLIC BLOOD PRESSURE: 128 MMHG | OXYGEN SATURATION: 96 % | BODY MASS INDEX: 30.61 KG/M2

## 2022-03-04 PROCEDURE — 97166 OT EVAL MOD COMPLEX 45 MIN: CPT | Mod: GO

## 2022-03-04 PROCEDURE — 97535 SELF CARE MNGMENT TRAINING: CPT | Mod: GO

## 2022-03-04 PROCEDURE — 97530 THERAPEUTIC ACTIVITIES: CPT | Mod: GP | Performed by: PHYSICAL THERAPIST

## 2022-03-04 PROCEDURE — 250N000011 HC RX IP 250 OP 636: Performed by: ORTHOPAEDIC SURGERY

## 2022-03-04 PROCEDURE — 97161 PT EVAL LOW COMPLEX 20 MIN: CPT | Mod: GP | Performed by: PHYSICAL THERAPIST

## 2022-03-04 PROCEDURE — 97116 GAIT TRAINING THERAPY: CPT | Mod: GP | Performed by: PHYSICAL THERAPIST

## 2022-03-04 PROCEDURE — 97110 THERAPEUTIC EXERCISES: CPT | Mod: GP | Performed by: PHYSICAL THERAPIST

## 2022-03-04 PROCEDURE — 250N000013 HC RX MED GY IP 250 OP 250 PS 637: Performed by: ORTHOPAEDIC SURGERY

## 2022-03-04 RX ORDER — AMOXICILLIN 250 MG
1 CAPSULE ORAL 2 TIMES DAILY
Qty: 30 TABLET | Refills: 0 | Status: SHIPPED | OUTPATIENT
Start: 2022-03-04

## 2022-03-04 RX ORDER — TAMSULOSIN HYDROCHLORIDE 0.4 MG/1
0.4 CAPSULE ORAL DAILY
Qty: 10 CAPSULE | Refills: 0 | Status: SHIPPED | OUTPATIENT
Start: 2022-03-04

## 2022-03-04 RX ORDER — HYDROXYZINE HYDROCHLORIDE 25 MG/1
25 TABLET, FILM COATED ORAL EVERY 6 HOURS PRN
Qty: 30 TABLET | Refills: 0 | Status: SHIPPED | OUTPATIENT
Start: 2022-03-04

## 2022-03-04 RX ORDER — OXYCODONE HYDROCHLORIDE 5 MG/1
5-10 TABLET ORAL EVERY 4 HOURS PRN
Qty: 30 TABLET | Refills: 0 | Status: SHIPPED | OUTPATIENT
Start: 2022-03-04

## 2022-03-04 RX ORDER — POLYETHYLENE GLYCOL 3350 17 G/17G
17 POWDER, FOR SOLUTION ORAL DAILY
Qty: 510 G | Refills: 0 | Status: SHIPPED | OUTPATIENT
Start: 2022-03-04

## 2022-03-04 RX ORDER — LORATADINE 10 MG/1
10 TABLET ORAL DAILY PRN
Qty: 10 TABLET | Refills: 0 | Status: SHIPPED | OUTPATIENT
Start: 2022-03-04

## 2022-03-04 RX ORDER — ACETAMINOPHEN 325 MG/1
975 TABLET ORAL EVERY 8 HOURS
Refills: 0 | COMMUNITY
Start: 2022-03-04

## 2022-03-04 RX ADMIN — CEFAZOLIN SODIUM 2 G: 2 INJECTION, SOLUTION INTRAVENOUS at 03:00

## 2022-03-04 RX ADMIN — POLYETHYLENE GLYCOL 3350 17 G: 17 POWDER, FOR SOLUTION ORAL at 08:10

## 2022-03-04 RX ADMIN — SENNOSIDES AND DOCUSATE SODIUM 1 TABLET: 50; 8.6 TABLET ORAL at 08:10

## 2022-03-04 RX ADMIN — THERA TABS 1 TABLET: TAB at 08:10

## 2022-03-04 RX ADMIN — OXYCODONE HYDROCHLORIDE 10 MG: 5 TABLET ORAL at 09:08

## 2022-03-04 RX ADMIN — ACETAMINOPHEN 975 MG: 325 TABLET ORAL at 09:08

## 2022-03-04 RX ADMIN — ACETAMINOPHEN 975 MG: 325 TABLET ORAL at 02:58

## 2022-03-04 RX ADMIN — HYDROMORPHONE HYDROCHLORIDE 2 MG: 2 TABLET ORAL at 06:07

## 2022-03-04 ASSESSMENT — ACTIVITIES OF DAILY LIVING (ADL)
ADLS_ACUITY_SCORE: 6
ADLS_ACUITY_SCORE: 6
ADLS_ACUITY_SCORE: 5
ADLS_ACUITY_SCORE: 5
ADLS_ACUITY_SCORE: 6
ADLS_ACUITY_SCORE: 5
ADLS_ACUITY_SCORE: 6
ADLS_ACUITY_SCORE: 6
ADLS_ACUITY_SCORE: 5
ADLS_ACUITY_SCORE: 6

## 2022-03-04 NOTE — DISCHARGE SUMMARY
[facility] Discharge Summary                                  [facility]     JAMAR ROBB [id number]   Age: 51 year old  PCP: Sumeet Thurston, [phone] 1970     Date of Admission:  3/3/2022  Date of Discharge::  3/4/2022  Discharge Provider:  Jerome Ellison NP    Code status:  Full Code    Admission Information:  Admission Diagnosis:  Acute low back pain [M54.50]  Foraminal stenosis due to intervertebral disc disease [M99.79, M51.9]    Post-Operative Day: 1 Day Post-Op     Reason for admission:  The patient was admitted for the following:Procedure(s) (LRB):  BILATERAL LUMBAR 3-4 ANTERIOR LUMBAR INTERBODY FUSION WITH INFUSE (Bilateral)  SURGICAL EXPOSURE, ANTERIOR APPROACH, WITH WOUND CLOSURE, FOR LUMBAR SPINE SURGERY, BY GENERAL SURGERY (Bilateral)    Active Problems:    Foraminal stenosis due to intervertebral disc disease      Allergies:  Oxycodone    Following the procedure noted above the patient was transferred to the post-op floor and started on:    Therapy:  physical therapy and occupational therapy  Anticoagulation Plan: scoanticoagulationlist2: Mechanical and/or ambulation   Pain Management: scopainmedication: oxycodone, dilaudid, tylenol and vistaril  Weight bearing status: Weight bearing as tolerated     The patient was followed by Orthopedics during the inpatient treatment course:  Complications:  Perioperatively, per protocol, the patient had a Dennison catheter placed. Unfortunately, when waking in PACU, he felt the urge to urinate constantly with the catheter in. The catheter was removed but since then, the patient is having severe, burning pain with urination. Fortunately, this is improving, despite his high post void residuals. We discuss the possibility of a UTI but with no signs of infection or fever, and the sudden onset, the history of present symptoms does not support an infection.  Patient agrees that the source of the pain is likely urethral trauma status  post holly placement/removal to the urethral lining.   I sent home instructions and verbally reviewed signs to monitor for in his discharge paperwork, including increasing pain, fever or blood in the urine.   Additional consultations:  None     Pertinent Labs   Lab Results: personally reviewed.     Recent Labs   Lab Test 03/03/22  1020   HGB 16.8   HCT 47.4   MCV 87             Discharge Information:  Condition at discharge: Stable  Discharge destination:  Discharged to home     Medications at discharge:  Current Discharge Medication List      START taking these medications    Details   acetaminophen (TYLENOL) 325 MG tablet Take 3 tablets (975 mg) by mouth every 8 hours  Refills: 0    Comments: Take for the next 7 to 10 days as prescribed to provide you with optimal pain control. Alternate with your pain medication for better relief.      loratadine (CLARITIN) 10 MG tablet Take 1 tablet (10 mg) by mouth daily as needed for other (itching)  Qty: 10 tablet, Refills: 0    Comments: Take each day while using opioid medication      oxyCODONE (ROXICODONE) 5 MG tablet Take 1-2 tablets (5-10 mg) by mouth every 4 hours as needed for breakthrough pain or moderate to severe pain  Qty: 30 tablet, Refills: 0    Comments: Take 1 pill for moderate pain (4-6/10) and 2 pills for severe pain (7-10/10). Alternate with Tylenol.      polyethylene glycol (MIRALAX) 17 GM/Dose powder Take 17 g by mouth daily  Qty: 510 g, Refills: 0    Comments: Take while taking narcotics medications and until bowels are back to normal routine. Hold for loose stools      senna-docusate (SENOKOT-S/PERICOLACE) 8.6-50 MG tablet Take 1 tablet by mouth 2 times daily  Qty: 30 tablet, Refills: 0    Comments: Take while taking opioid medications and until bowels are back to normal routine. Hold for loose stools      tamsulosin (FLOMAX) 0.4 MG capsule Take 1 capsule (0.4 mg) by mouth daily  Qty: 10 capsule, Refills: 0    Comments: Take x 10 days while  urethra is causing burning pains. May stop taking if urinary burning has been resolved x 2 days.         CONTINUE these medications which have CHANGED    Details   hydrOXYzine (ATARAX) 25 MG tablet Take 1 tablet (25 mg) by mouth every 6 hours as needed for other (adjuvant pain)  Qty: 30 tablet, Refills: 0    Comments: May use Benadryl for evening,in place of evening hydroxyzine doses. Maximum 4 doses of anti itch medication per day         CONTINUE these medications which have NOT CHANGED    Details   omeprazole (PRILOSEC OTC) 20 MG EC tablet Take 20 mg by mouth daily as needed         STOP taking these medications       naproxen sodium (ANAPROX) 220 MG tablet Comments:   Reason for Stopping:                        Follow-Up Care:  Patient should be seen in the office in 10-14 days by the Orthopedic Surgeon/Physician Assistant.  Call [phone] for appointment or questions.    It was my pleasure to take care of the above patient.  Jerome Ellison NP

## 2022-03-04 NOTE — PROGRESS NOTES
03/04/22 0938   Quick Adds   Type of Visit Initial PT Evaluation   Living Environment   People in Home spouse   Current Living Arrangements house   Home Accessibility stairs within home   Number of Stairs, Within Home, Primary seven   Stair Railings, Within Home, Primary railing on left side (ascending)   Transportation Anticipated family or friend will provide   Living Environment Comments Pt lives in split level but expresses confidence regarding stairs. Says that he has rehabbed several injuries at this location in the past.    Self-Care   Number of times patient has fallen within last six months 1   Activity/Exercise/Self-Care Comment Patient independent and says that he's been living with back pain for a long time.    General Information   Onset of Illness/Injury or Date of Surgery 03/03/22   Referring Physician Stephon   Patient/Family Therapy Goals Statement (PT) Return to home ASAP   Pertinent History of Current Problem (include personal factors and/or comorbidities that impact the POC) R LE pain and weakness secondary to DDD; L3/L4 anterior interbody fusion   Existing Precautions/Restrictions spinal   Cognition   Affect/Mental Status (Cognition) WFL   Orientation Status (Cognition) oriented x 4   Behavioral Issues uncooperative   Safety Deficit (Cognition) safety precautions follow-through/compliance  (pt several times states he will not follow safety procedures)   Cognitive Status Comments Patient was dismissive of physical therapy education and antagonistic toward therapist at times, perhaps due to pain.    Pain Assessment   Patient Currently in Pain Yes, see Vital Sign flowsheet   Posture    Posture Forward head position;Protracted shoulders;Kyphosis;Lordosis   Left Ankle/Foot (Manual Muscle Testing)   Ankle Dorsiflexion - Left Side 5/5) normal,left   Right Ankle/Foot (Manual Muscle Testing)   Ankle Dorsiflexion - Right Side (4/5) good, right   Bed Mobility   Bed Mobility rolling right;supine-sit    Rolling Right [place] (Bed Mobility) independent   Supine-Sit [place] (Bed Mobility) independent   Bed Mobility Limitations other (see comments)  (spinal precautions)   Comment, (Bed Mobility) Patient demonstrates modified log roll that he's been using at home for many months; this maneuver is non-standard but does adhere to post-surgical precautions   Transfers   Transfers sit-stand transfer   Maintains Weight-bearing Status (Transfers) able to maintain   Sit-Stand Transfer   Sit-Stand [place] (Transfers) modified independence   Assistive Device (Sit-Stand Transfers) walker, front-wheeled   Gait/Stairs (Locomotion)   [place] Level (Gait) supervision   Assistive Device (Gait) walker, front-wheeled   Distance in Feet (Required for LE Total Joints) 200, 350   Pattern (Gait) step-through   Deviations/Abnormal Patterns (Gait)   (some scuffing or R foot during swing phase)   Maintains Weight-bearing Status (Gait) able to maintain   Negotiation (Stairs) stairs independence;stairs assistive device;handrail location;number of steps;ascending technique;descending technique;maintains weight-bearing status   [place] Level (Stairs) modified independence   Handrail Location (Stairs) left side (ascending)   Number of Steps (Stairs) 7   Ascending Technique (Stairs) step-over-step   Descending Technique (Stairs) step-over-step   Maintains Weight-bearing Status (Stairs) able to maintain   Balance   Balance Comments WFL   Clinical Impression   Criteria for Skilled Therapeutic Intervention Yes, treatment indicated   PT Diagnosis (PT) impaired functional mobility   Influenced by the following impairments post-surgical precautisons, weakness, pain   Functional limitations due to impairments bed mobility, ambulation, transfers   Clinical Presentation (PT Evaluation Complexity) Stable/Uncomplicated   Clinical Presentation Rationale presents as diagnosed   Clinical Decision Making (Complexity) moderate complexity    Planned Therapy Interventions (PT) gait training;bed mobility training;strengthening;transfer training   Anticipated Equipment Needs at Discharge (PT) other (see comments)  (patient refuses/declines FWW)   Risk & Benefits of therapy have been explained evaluation/treatment results reviewed;care plan/treatment goals reviewed;risks/benefits reviewed;current/potential barriers reviewed;participants voiced agreement with care plan;participants included;patient;spouse/significant other   PT Discharge Planning   PT Discharge Recommendation (DC Rec) home with assist;home with outpatient physical therapy   PT Rationale for DC Rec Patient able to return home with spousal assistance to maintain post-surgical spinal precautions. Outpatient PT advised.   Total Evaluation Time   Total Evaluation Time (Minutes) 15   Physical Therapy Goals   PT Frequency One time eval and treatment only   PT Predicated Duration/Target Date for Goal Attainment 03/07/22   PT Goals Bed Mobility;Transfers;Gait;Stairs;PT Goal 1   PT: Bed Mobility Modified independent;Supine to/from sit;Within precautions;Goal Met   PT: Transfers Modified independent;Assistive device;Within precautions;Goal Met   PT: Gait Greater than 200 feet;Goal Met;Supervision/stand-by assist;Within precautions   PT: Stairs 7 stairs;Rail on left;Goal Met;Supervision/stand-by assist;Within precautions   PT: Goal 1 I with HEP

## 2022-03-04 NOTE — PROGRESS NOTES
"Pt left facility accompanied by staff to return home. Pt was stable upon dc and stated they were ready for dc. Pt was not sent with  prescriptions and \"how am I doing today\" education. Medication mgmt, pain mgmt, f/u's and constipation mgmt were discussed with pt who verbalized understanding/agreement.  "

## 2022-03-04 NOTE — PROGRESS NOTES
"[facility]   Spine Progress Note - Lumbar    Post-operative Day: 1 Day Post-Op    Procedure(s):  BILATERAL LUMBAR 3-4 ANTERIOR LUMBAR INTERBODY FUSION WITH INFUSE  SURGICAL EXPOSURE, ANTERIOR APPROACH, WITH WOUND CLOSURE, FOR LUMBAR SPINE SURGERY, BY GENERAL SURGERY    Plan: Anticoagulation protocol:    Mechanical and/or ambulation  Post op urinary retention due to urethral trauma s/p Dennison insertion and removal: will prescribe Tamulosin x 10 days to relax bladder neck.   Pain medications:  Oxycodone, tylenol and hydroxyzine.    Itching with oxycodone: use hydroxyzine for daytime, and Benadryl in the evening.     Sleep disturbances: Jeromy acknowledges some of his sleeplessness may have been related to procedural anxiety, but he is interested in getting better sleep. I encourage him to follow up with his PCP regarding this in the future, but am concerned about prescribing an additional sleep aid (such as Trazodone) post operatively.               Weight bearing status:  WBAT            Disposition:home today, accompanied by spouse             Continue cares and rehabilitation.  Lumbar corset when out of bed.    Subjective:  Today Jeromy reports two chief complaints: severe burning with urination and itching with oxycodone. He explains after the catheter was placed and removed, he felt severe burning with urination, stating, \"its like sandpaper was along the edge of that catheter.\" Fortunately, this is slowly improving as patient just emptied his bladder and had 228 scanned.    Secondly, Jeromy reports severe itching when using oxycodone, but unfortunately, did not feel dilaudid was helpful for his pain. Hydroxyzine does seem to help, but he has only had one dose of it while inpatient.     Finally Jeromy explains he has not been able to sleep prior to surgery for more than 4 hours at a time and is anxious about being able to get sleep.      Pain: moderate  Pain location: inferior abdomen  Chest pain, " SOB:  none  Nausea/vomiting:  none  Neuro:  No numbness, tingling, or weakness reported in bilateral LE      Objective:    Vital signs in last 24 hours  Temp:  [97.3  F (36.3  C)-98.6  F (37  C)] 97.9  F (36.6  C)  Pulse:  [74-97] 97  Resp:  [8-18] 18  BP: (113-158)/(62-88) 128/83  SpO2:  [90 %-97 %] 96 %  Wt Readings from Last 1 Encounters:   03/03/22 102.5 kg (226 lb)     Temp Readings from Last 1 Encounters:   03/04/22 97.9  F (36.6  C) (Oral)     BP Readings from Last 1 Encounters:   03/04/22 128/83     Pulse Readings from Last 1 Encounters:   03/04/22 97       Wound status: dressing is clean dry and intact. Yes, with one pin prick spot of light shadowing.    Circulation: Dorsalis pedis pulse 2+ bilaterally  Motor:  5/5 strength hip flexors and adductors, quads, AT, EHL, and gastrocs bilateral LE  Sensation: Intact sensation bilateral LE L2-S1, no LE swelling  Calf tenderness:  bilateral  none      Pertinent Labs     Lab Results: personally reviewed.   Lab Results   Component Value Date    WBC 6.6 03/03/2022    HGB 16.8 03/03/2022    HCT 47.4 03/03/2022    MCV 87 03/03/2022     03/03/2022     No results for input(s): INR in the last 168 hours.       Report completed by:  Jerome Ellison NP  Date: 3/4/2022  Time: 9:41 AM

## 2022-03-04 NOTE — PLAN OF CARE
Physical Therapy Discharge Summary    Reason for therapy discharge:    All goals and outcomes met, no further needs identified.    Progress towards therapy goal(s). See goals on Care Plan in [facility] electronic health record for goal details.  Goals met    Therapy recommendation(s):    Continued therapy is recommended.  Rationale/Recommendations:  Ouptient PT.

## 2022-03-04 NOTE — PLAN OF CARE
Patient vital signs are at baseline: Yes  Patient able to ambulate as they were prior to admission or with assist devices provided by therapies during their stay:  Yes  Patient MUST void prior to discharge:  Yes  Patient able to tolerate oral intake:  Yes  Pain has adequate pain control using Oral analgesics:  Yes  Does patient have an identified :  Yes  Has goal D/C date and time been discussed with patient:  Yes    Pt care supervisor reviewed AVS w/ pt & spouse to satisfaction. Returned belongings to pt. Discharged to home.

## 2022-03-04 NOTE — PLAN OF CARE
Goal Outcome Evaluation: Plan is to go home today if patient continues voiding and passes therapy.    VSS. Pain has been minimal for back. Patient has had trouble with burning pain during voiding, but reports not having problems with retention. Patient has been stopping urination before bladder is empty due to pain. Patient says he did not come into hospital with bladder pain and pain started after holly placement and removal. Ambulating well and tolerating regular diet. Patient is now breathing on RA.    Meaghan Rangel RN

## 2022-03-04 NOTE — PLAN OF CARE
" Goal Outcome Evaluation:    Problem: Pain (Spinal Surgery)  Goal: Acceptable Pain Control  Outcome: Ongoing, Progressing  Intervention: Prevent or Manage Pain  Recent Flowsheet Documentation  Taken 3/3/2022 1930 by Tom Jones RN  Complementary Therapy: (TV) other (see comments)  Taken 3/3/2022 1812 by Tom Jones RN  Pain Management Interventions:   medication (see MAR)   Provider notified (comment)   cold applied   distraction   aromatherapy   pain management plan reviewed with patient/caregiver   pillow support provided   therapeutic touch   rest   repositioned    Problem: Bleeding (Spinal Surgery)  Goal: Absence of Bleeding  Outcome: Ongoing, Progressing     Problem: Functional Ability Impaired (Spinal Surgery)  Goal: Optimal Functional Ability  Outcome: Ongoing, Progressing      Patient vital signs are at baseline: Yes  Patient able to ambulate as they were prior to admission or with assist devices provided by therapies during their stay:  No,  Reason:  Pt has ambulated 60ft during shitt.  Patient MUST void prior to discharge:  No,  Reason:  Pt is currently feeling pain \"like sand paper\" and doesn't want to void. Education was provided to pt and pt acknowledge understanding.   Patient able to tolerate oral intake:  Yes  Pain has adequate pain control using Oral analgesics:  Yes  Does patient have an identified :  Yes  Has goal D/C date and time been discussed with patient:  Yes. Discharge plan for 3/4/2022 10am.                          "

## 2022-03-04 NOTE — PROGRESS NOTES
03/04/22 0800   Quick Adds   Type of Visit Initial Occupational Therapy Evaluation   Living Environment   People in Home spouse   Current Living Arrangements house  (Split level)   Living Environment Comments Pt lives in a split level but can meet needs on 1 floor. Has shower w/ grab bars, high toilet   Self-Care   Usual Activity Tolerance good   Current Activity Tolerance moderate   Equipment Currently Used at Home grab bar, tub/shower;raised toilet seat  (reacher)   Fall history within last six months yes   Number of times patient has fallen within last six months 1   Activity/Exercise/Self-Care Comment Pt previously IND with ADLs   General Information   Onset of Illness/Injury or Date of Surgery 03/03/22   Referring Physician Abhijit Szymanski MD   Patient/Family Therapy Goal Statement (OT) Not endorsed   Additional Occupational Profile Info/Pertinent History of Current Problem S/p L3-4 fusion   Existing Precautions/Restrictions fall;spinal   Left Upper Extremity (Weight-bearing Status) partial weight-bearing (PWB)  (10 lb)   Right Upper Extremity (Weight-bearing Status) partial weight-bearing (PWB)  (10 lb)   Cognitive Status Examination   Orientation Status orientation to person, place and time   Visual Perception   Visual Impairment/Limitations WFL   Sensory   Sensory Quick Adds No deficits were identified   Pain Assessment   Patient Currently in Pain Yes, see Vital Sign flowsheet   Integumentary/Edema   Integumentary/Edema no deficits were identifed   Posture   Posture forward head position   Strength Comprehensive (MMT)   Comment, General Manual Muscle Testing (MMT) Assessment Pt is generally decondtioned   Muscle Tone Assessment   Muscle Tone Quick Adds No deficits were identified   Coordination   Upper Extremity Coordination No deficits were identified   Bed Mobility   Bed Mobility supine-sit;sit-supine   Supine-Sit [place] (Bed Mobility) verbal cues;nonverbal cues (demo/gesture);minimum assist  (75% patient effort)   Sit-Supine [place] (Bed Mobility) supervision;verbal cues;nonverbal cues (demo/gesture)   Transfers   Transfers sit-stand transfer;toilet transfer;shower transfer   Sit-Stand Transfer   Sit-Stand [place] (Transfers) supervision;verbal cues;nonverbal cues (demo/gesture)   Shower Transfer   Type (Shower Transfer) lateral   [place] Level (Shower Transfer) verbal cues;nonverbal cues (demo/gesture);contact guard   Assistive Device (Shower Transfer) grab bar, tub rail   Toilet Transfer   Type (Toilet Transfer) sit-stand;stand-sit   [place] Level (Toilet Transfer) supervision;verbal cues;nonverbal cues (demo/gesture)   Assistive Device (Toilet Transfer) grab bars/safety frame   Activities of Daily Living   BADL Assessment/Intervention lower body dressing   Lower Body Dressing Assessment/Training   [place] Level (Lower Body Dressing) supervision;verbal cues;nonverbal cues (demo/gesture)   Position (Lower Body Dressing) supported sitting   Clinical Impression   Criteria for Skilled Therapeutic Interventions Met (OT) Yes, treatment indicated   OT Diagnosis ADL dysfunction   OT Problem List-Impairments impacting ADL activity tolerance impaired;balance;mobility;strength;post-surgical precautions;pain   Assessment of Occupational Performance 3-5 Performance Deficits   Identified Performance Deficits dressing, toileting, bathing, mobility   Planned Therapy Interventions (OT) ADL retraining;bed mobility training;transfer training   Clinical Decision Making Complexity (OT) moderate complexity   Anticipated Equipment Needs Upon Discharge (OT) tub bench;shower chair   Risk & Benefits of therapy have been explained evaluation/treatment results reviewed;care plan/treatment goals reviewed;risks/benefits reviewed;current/potential barriers reviewed;participants voiced agreement with care plan;participants included;patient;spouse/significant other   OT Discharge Planning   OT Discharge  Recommendation (DC Rec) home with assist   OT Rationale for DC Rec Patient is below PLOF but has met his IP OT goals after 1 treatment. Writer anticipates pt can safely return home with assist from spouse   Total Evaluation Time (Minutes)   Total Evaluation Time (Minutes) 10   OT Goals   Therapy Frequency (OT) One time eval and treatment   OT Predicated Duration/Target Date for Goal Attainment 03/04/22   OT Goals Lower Body Dressing;Bed Mobility;Toilet Transfer/Toileting;OT Goal 1   OT: Lower Body Dressing Supervision/stand-by assist;using adaptive equipment;within precautions;Goal Met;Completed   OT: Bed Mobility Supervision/stand-by assist;supine to/from sitting;Goal Met;Completed   OT: Toilet Transfer/Toileting Supervision/stand-by assist;toilet transfer;cleaning and garment management;using adaptive equipment;Goal Met;Completed   OT: Goal 1 Pt will complete a shower transfer @ SBA in all attempts. Goal met

## 2022-03-05 NOTE — PROGRESS NOTES
Care Management Discharge Note    Discharge Date: 03/04/2022       Discharge Disposition: Home    Discharge Services:  (OP PT)    Discharge DME:  (per ortho/therapy)    Discharge Transportation: family or friend will provide    Private pay costs discussed: None indicated    Additional Information:  Chart reviewed. No CM needs. Discharged per bedside RN.         Celsa Johnson, RN

## (undated) DEVICE — DRAPE IOBAN INCISE 23X17" 6650EZ

## (undated) DEVICE — TOOL DISSECT MIDAS MR8 14CM MATCH HEAD 3MM MR8-14MH30

## (undated) DEVICE — SOL WATER IRRIG 1000ML BOTTLE 2F7114

## (undated) DEVICE — SUTURE PDS 0 60IN CTX+ LOOPED PDP990G

## (undated) DEVICE — GLOVE BIOGEL PI ULTRATOUCH G SZ 7.5 42175

## (undated) DEVICE — GLOVE SURG PI ULTRA TOUCH M SZ 7-1/2 LF

## (undated) DEVICE — CUSTOM PACK LUMBAR FUSION SNE5BLFHEA

## (undated) DEVICE — SUCTION TIP YANKAUER W/O VENT K86

## (undated) DEVICE — GLOVE BIOGEL INDICATOR 7.5 LF 41675

## (undated) DEVICE — DRSG PRIMAPORE 04X11 3/4"

## (undated) DEVICE — ESU ELEC BLADE 6" COATED E1450-6

## (undated) DEVICE — HLSTR JET MULTI-INST SFTY STRL FIRE-SFE LF 7212

## (undated) DEVICE — CUSTOM CATH LAB BASIN SET PACK 640PACK LHE SUTCNBPFCA

## (undated) DEVICE — SUTURE VICRYL+ 2-0 27IN CT-1 UND VCP259H

## (undated) DEVICE — DRAPE STERI 23X17 NON STERILE 1010NSD

## (undated) DEVICE — SUTURE VICRYL+ 3-0 18IN PS-2 UND VCP497H

## (undated) DEVICE — SUCTION MANIFOLD NEPTUNE 2 SYS 1 PORT 702-025-000

## (undated) DEVICE — GOWN IMPERVIOUS BREATHABLE SMART LG 89015

## (undated) DEVICE — Device

## (undated) DEVICE — ADH LIQUID MASTISOL TOPICAL VIAL 2-3ML 0523-48

## (undated) DEVICE — SUCTION TIP POOLE STERILE 35040

## (undated) DEVICE — CATH IV ANGIO INTRO 14GA X 1 3/4" 381467

## (undated) DEVICE — SYR BULB IRRIG DOVER 60 ML LATEX FREE 67000

## (undated) DEVICE — DRAPE C-ARMOR 5 SIDED 5523

## (undated) DEVICE — PLATE GROUNDING ADULT W/CORD 9165L

## (undated) DEVICE — DRAPE BACK TABLE PADDED 60X90

## (undated) DEVICE — BLADE KNIFE SURG 11 371111

## (undated) DEVICE — CATH TRAY FOLEY SURESTEP 16FR DRAIN BAG STATOCK A899916

## (undated) DEVICE — SUTURE SILK 2-0 TIES 30IN SA85H

## (undated) DEVICE — ESU PENCIL SMOKE EVAC W/ROCKER SWITCH 0703-047-000

## (undated) DEVICE — GLOVE BIOGEL PI ULTRATOUCH G SZ 8.0 42180

## (undated) DEVICE — PREP CHLORAPREP 26ML TINTED HI-LITE ORANGE 930815

## (undated) DEVICE — ESU LIGASURE MARYLAND JAW OPEN VESSEL 23CM LF1923

## (undated) DEVICE — SU MONOCRYL+ 4-0 18IN PS2 UND MCP496G

## (undated) DEVICE — DRSG STERI STRIP 1/2X4" R1547

## (undated) DEVICE — RX SURGIFLO HEMOSTATIC MATRIX 8ML 2991

## (undated) RX ORDER — PROPOFOL 10 MG/ML
INJECTION, EMULSION INTRAVENOUS
Status: DISPENSED
Start: 2022-03-03

## (undated) RX ORDER — ONDANSETRON 2 MG/ML
INJECTION INTRAMUSCULAR; INTRAVENOUS
Status: DISPENSED
Start: 2022-03-03

## (undated) RX ORDER — FENTANYL CITRATE 50 UG/ML
INJECTION, SOLUTION INTRAMUSCULAR; INTRAVENOUS
Status: DISPENSED
Start: 2022-03-03

## (undated) RX ORDER — DEXAMETHASONE SODIUM PHOSPHATE 10 MG/ML
INJECTION, EMULSION INTRAMUSCULAR; INTRAVENOUS
Status: DISPENSED
Start: 2022-03-03

## (undated) RX ORDER — LIDOCAINE HYDROCHLORIDE 10 MG/ML
INJECTION, SOLUTION EPIDURAL; INFILTRATION; INTRACAUDAL; PERINEURAL
Status: DISPENSED
Start: 2022-03-03